# Patient Record
Sex: MALE | Race: WHITE
[De-identification: names, ages, dates, MRNs, and addresses within clinical notes are randomized per-mention and may not be internally consistent; named-entity substitution may affect disease eponyms.]

---

## 2020-01-17 ENCOUNTER — HOSPITAL ENCOUNTER (EMERGENCY)
Dept: HOSPITAL 62 - ER | Age: 45
Discharge: HOME | End: 2020-01-17
Payer: SELF-PAY

## 2020-01-17 VITALS — DIASTOLIC BLOOD PRESSURE: 94 MMHG | SYSTOLIC BLOOD PRESSURE: 153 MMHG

## 2020-01-17 DIAGNOSIS — I10: ICD-10-CM

## 2020-01-17 DIAGNOSIS — F17.200: ICD-10-CM
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
## 2020-01-17 DIAGNOSIS — H20.9: ICD-10-CM

## 2020-01-17 DIAGNOSIS — T15.92XA: Primary | ICD-10-CM

## 2020-01-17 DIAGNOSIS — H18.002: ICD-10-CM

## 2020-01-17 PROCEDURE — 90715 TDAP VACCINE 7 YRS/> IM: CPT

## 2020-01-17 PROCEDURE — 90471 IMMUNIZATION ADMIN: CPT

## 2020-01-17 PROCEDURE — 99283 EMERGENCY DEPT VISIT LOW MDM: CPT

## 2020-01-17 NOTE — ER DOCUMENT REPORT
ED Eye Complaint





- General


Chief Complaint: Foreign Body in Eye


Stated Complaint: LEFT EYE PAIN


Time Seen by Provider: 01/17/20 14:49


Primary Care Provider: 


MARCY GUTIERREZ DO [ACTIVE STAFF] - 01/21/20


Notes: 





Patient is a very pleasant 44-year-old male who comes in complaining of left eye

pain.  Patient was at the beach yesterday and felt something blow in his face.  

Rubbed his eye and has had pain since.  He does not weld.  He does not wear 

contacts.  No purulent drainage.  No other symptoms or concerns.  States that it

feels like his eyes burning.  Pain has been moderate.  No difficulty with vision

but the light is bothering his left eye.





- Related Data


Allergies/Adverse Reactions: 


                                        





No Known Allergies Allergy (Unverified 01/17/20 14:50)


   











Past Medical History





- Social History


Smoking Status: Current Every Day Smoker


Lives with: Family


Family History: Reviewed & Not Pertinent


Patient has suicidal ideation: No


Patient has homicidal ideation: No





- Past Medical History


Cardiac Medical History: Reports: Hx Hypertension - uncontrolled


Pulmonary Medical History: Reports: Hx Bronchitis


Past Surgical History: Reports: Hx Appendectomy, Hx Tonsillectomy





Review of Systems





- Review of Systems


-: Yes All other systems reviewed and negative





Physical Exam





- Vital signs


Vitals: 


                                        











Temp Pulse Resp BP Pulse Ox


 


 98.3 F   97   18   191/94 H  100 


 


 01/17/20 14:37  01/17/20 14:37  01/17/20 14:37  01/17/20 14:37  01/17/20 14:37











Interpretation: Normal





- General


General appearance: Appears well, Alert





- HEENT


Head: Normocephalic, Atraumatic


Eyes: Normal


Cornea: Embedded foreign body


Extraocular movements intact: Yes


Pupils: PERRL


Visual acuity- Right eye: 20/20


Visual acuity- Left eye: 20/40


Visual acuity- Both eyes: 20/20


Corrective lenses worn: No





- Respiratory


Respiratory status: No respiratory distress


Chest status: Nontender


Breath sounds: Normal


Chest palpation: Normal





- Cardiovascular


Rhythm: Regular


Heart sounds: Normal auscultation


Murmur: No





- Abdominal


Inspection: Normal


Distension: No distension


Bowel sounds: Normal


Tenderness: Nontender


Organomegaly: No organomegaly





- Back


Back: Normal, Nontender





- Extremities


General upper extremity: Normal inspection, Nontender, Normal color, Normal ROM,

Normal temperature


General lower extremity: Normal inspection, Nontender, Normal color, Normal ROM,

Normal temperature, Normal weight bearing.  No: Mahad's sign





- Neurological


Neuro grossly intact: Yes


Cognition: Normal


Orientation: AAOx4


Cunningham Coma Scale Eye Opening: Spontaneous


Cunningham Coma Scale Verbal: Oriented


Cunningham Coma Scale Motor: Obeys Commands


Maco Coma Scale Total: 15


Speech: Normal


Motor strength normal: LUE, RUE, LLE, RLE


Sensory: Normal





- Psychological


Associated symptoms: Normal affect, Normal mood





- Skin


Skin Temperature: Warm


Skin Moisture: Dry


Skin Color: Normal





Course





- Re-evaluation


Re-evalutation: 





01/17/20


Patient with a foreign body, small piece of metal in his left eye that has been 

removed with a 18-gauge needle in the emergency department.  Patient states that

he is feeling better.  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  Dr. Gutierrez was

contacted and can see the patient Tuesday in the office.  Given prescription for

Acular and erythromycin ointment.  Follow-up in the office Tuesday as 

instructed.  Understands and agrees with plan.  Stable for discharge.  Return if

any worsening or concerning symptoms.  Grateful for care.





- Vital Signs


Vital signs: 


                                        











Temp Pulse Resp BP Pulse Ox


 


 97.6 F   93   18   153/94 H  95 


 


 01/17/20 19:29  01/17/20 19:29  01/17/20 14:37  01/17/20 19:29  01/17/20 19:29














Procedures





- Eye Procedure


  ** Left


Foreign body removal: Left


Alcaine Drops Administered: Yes


Acular drops administered: Left


Fluorescein applied: Left


Antibiotic Oinment/Drps Admin: Left eye


Cyclogel 2 Drops Administered: Left eye


Slit lamp used: No


Notes: 





01/17/20 


Small piece of metal removed but still with rust ring over pupil





Discharge





- Discharge


Clinical Impression: 


 Corneal rust ring of left eye, Iritis





Foreign body in eye


Qualifiers:


 Encounter type: initial encounter Laterality: left Qualified Code(s): T15.92XA 

- Foreign body on external eye, part unspecified, left eye, initial encounter





Condition: Stable


Disposition: HOME, SELF-CARE


Instructions:  Corneal Foreign Body with Rust ([facility])


Prescriptions: 


Ketorolac Tromethamine 0.45% [Acuvail 0.45% Oph Soln 0.4 ml/Dropperette] 1 drop 

OS TID #2 droperette


Erythromycin Base [Erythromycin Oph 1 Gm Oint Ud] 1 applic OS QID #1 tube


Forms:  Return to Work


Referrals: 


MARCY GUTIERREZ DO [ACTIVE STAFF] - 01/21/20

## 2020-01-17 NOTE — ER DOCUMENT REPORT
ED Medical Screen (RME)





- General


Chief Complaint: Eye Pain


Stated Complaint: LEFT EYE PAIN


Time Seen by Provider: 01/17/20 14:49


Primary Care Provider: 


JULISA RICHARDS [Primary Care Provider] - Follow up as needed





- HPI


Notes: 





01/17/20 14:50


Patient is a 44-year-old male who presents complaining of foreign body sensation

left eye x2 days when he was on the beach.  Patient believes that it may have 

been sand or something in his eye.  He has tried irrigating with no relief.  He 

does not wear contact lenses.  No fever or headache.





I have treated and performed a rapid initial assessment of this patient.  A 

comprehensive ED assessment and evaluation of the patient, analysis of test 

results and completion of medical decision making process will be conducted by 

additional ED providers.





PHYSICAL EXAMINATION:





GENERAL: Well-appearing, well-nourished and in no acute distress.  A&Ox4.  

Answers questions appropriately.


Left eye: There is a foreign body noted to white light to the mid corneal area 

that is dark in color.  + injection.





- Related Data


Allergies/Adverse Reactions: 


                                        





No Known Allergies Allergy (Unverified 01/17/20 14:50)


   











Physical Exam





- Vital signs


Vitals: 





                                        











Temp Pulse Resp BP Pulse Ox


 


 98.3 F   97   18   191/94 H  100 


 


 01/17/20 14:37  01/17/20 14:37  01/17/20 14:37  01/17/20 14:37  01/17/20 14:37














Course





- Vital Signs


Vital signs: 





                                        











Temp Pulse Resp BP Pulse Ox


 


 98.3 F   97   18   191/94 H  100 


 


 01/17/20 14:37  01/17/20 14:37  01/17/20 14:37  01/17/20 14:37  01/17/20 14:37














Doctor's Discharge





- Discharge


Referrals: 


JULISA RICHARDS [Primary Care Provider] - Follow up as needed